# Patient Record
Sex: FEMALE | Race: WHITE | NOT HISPANIC OR LATINO | Employment: PART TIME | URBAN - METROPOLITAN AREA
[De-identification: names, ages, dates, MRNs, and addresses within clinical notes are randomized per-mention and may not be internally consistent; named-entity substitution may affect disease eponyms.]

---
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
## 2024-05-08 ENCOUNTER — HOSPITAL ENCOUNTER (OUTPATIENT)
Facility: CLINIC | Age: 19
End: 2024-05-08
Attending: INTERNAL MEDICINE | Admitting: INTERNAL MEDICINE
Payer: COMMERCIAL

## 2024-05-28 RX ORDER — OMEPRAZOLE 40 MG/1
40 CAPSULE, DELAYED RELEASE ORAL DAILY
COMMUNITY
End: 2024-05-31 | Stop reason: HOSPADM

## 2024-05-28 RX ORDER — MIRTAZAPINE 15 MG/1
15 TABLET, ORALLY DISINTEGRATING ORAL AT BEDTIME
COMMUNITY
End: 2024-05-31 | Stop reason: HOSPADM

## 2024-08-08 RX ORDER — BUPROPION HYDROCHLORIDE 100 MG/1
100 TABLET, EXTENDED RELEASE ORAL DAILY
COMMUNITY
Start: 2024-05-21 | End: 2025-05-21

## 2024-08-08 RX ORDER — MIRTAZAPINE 15 MG/1
15 TABLET, FILM COATED ORAL AT BEDTIME
COMMUNITY
Start: 2024-06-27

## 2024-08-15 ENCOUNTER — ANESTHESIA EVENT (OUTPATIENT)
Dept: SURGERY | Facility: CLINIC | Age: 19
End: 2024-08-15
Payer: COMMERCIAL

## 2024-08-15 ENCOUNTER — HOSPITAL ENCOUNTER (OUTPATIENT)
Facility: CLINIC | Age: 19
Discharge: HOME | End: 2024-08-15
Attending: INTERNAL MEDICINE | Admitting: INTERNAL MEDICINE
Payer: COMMERCIAL

## 2024-08-15 ENCOUNTER — ANESTHESIA (OUTPATIENT)
Dept: SURGERY | Facility: CLINIC | Age: 19
End: 2024-08-15
Payer: COMMERCIAL

## 2024-08-15 VITALS
BODY MASS INDEX: 19.23 KG/M2 | RESPIRATION RATE: 18 BRPM | HEART RATE: 65 BPM | DIASTOLIC BLOOD PRESSURE: 69 MMHG | TEMPERATURE: 97.6 F | WEIGHT: 104.5 LBS | SYSTOLIC BLOOD PRESSURE: 115 MMHG | HEIGHT: 62 IN | OXYGEN SATURATION: 99 %

## 2024-08-15 LAB
ALBUMIN SERPL BCG-MCNC: 4.5 G/DL (ref 3.5–5.2)
ALP SERPL-CCNC: 82 U/L (ref 40–150)
ALT SERPL W P-5'-P-CCNC: 55 U/L (ref 0–50)
ANION GAP SERPL CALCULATED.3IONS-SCNC: 17 MMOL/L (ref 7–15)
AST SERPL W P-5'-P-CCNC: 22 U/L (ref 0–45)
BILIRUB SERPL-MCNC: 0.3 MG/DL
BUN SERPL-MCNC: 5.7 MG/DL (ref 6–20)
CALCIUM SERPL-MCNC: 9.1 MG/DL (ref 8.8–10.4)
CHLORIDE SERPL-SCNC: 103 MMOL/L (ref 98–107)
CREAT SERPL-MCNC: 0.61 MG/DL (ref 0.51–0.95)
EGFRCR SERPLBLD CKD-EPI 2021: >90 ML/MIN/1.73M2
ERYTHROCYTE [DISTWIDTH] IN BLOOD BY AUTOMATED COUNT: 11.5 % (ref 10–15)
GLUCOSE SERPL-MCNC: 91 MG/DL (ref 70–99)
HCO3 SERPL-SCNC: 21 MMOL/L (ref 22–29)
HCT VFR BLD AUTO: 40 % (ref 35–47)
HGB BLD-MCNC: 14.2 G/DL (ref 11.7–15.7)
INR PPP: 1.05 (ref 0.85–1.15)
MCH RBC QN AUTO: 30 PG (ref 26.5–33)
MCHC RBC AUTO-ENTMCNC: 35.5 G/DL (ref 31.5–36.5)
MCV RBC AUTO: 84 FL (ref 78–100)
PLATELET # BLD AUTO: 200 10E3/UL (ref 150–450)
POTASSIUM SERPL-SCNC: 3.4 MMOL/L (ref 3.4–5.3)
PROT SERPL-MCNC: 6.9 G/DL (ref 6.4–8.3)
RBC # BLD AUTO: 4.74 10E6/UL (ref 3.8–5.2)
SODIUM SERPL-SCNC: 141 MMOL/L (ref 135–145)
UPPER EUS: NORMAL
WBC # BLD AUTO: 5.5 10E3/UL (ref 4–11)

## 2024-08-15 PROCEDURE — 88305 TISSUE EXAM BY PATHOLOGIST: CPT | Mod: TC | Performed by: INTERNAL MEDICINE

## 2024-08-15 PROCEDURE — 250N000009 HC RX 250

## 2024-08-15 PROCEDURE — 250N000009 HC RX 250: Performed by: INTERNAL MEDICINE

## 2024-08-15 PROCEDURE — 82247 BILIRUBIN TOTAL: CPT | Performed by: INTERNAL MEDICINE

## 2024-08-15 PROCEDURE — C1726 CATH, BAL DIL, NON-VASCULAR: HCPCS | Performed by: INTERNAL MEDICINE

## 2024-08-15 PROCEDURE — 250N000011 HC RX IP 250 OP 636: Performed by: ANESTHESIOLOGY

## 2024-08-15 PROCEDURE — 85610 PROTHROMBIN TIME: CPT | Performed by: INTERNAL MEDICINE

## 2024-08-15 PROCEDURE — 250N000011 HC RX IP 250 OP 636

## 2024-08-15 PROCEDURE — 85027 COMPLETE CBC AUTOMATED: CPT | Performed by: INTERNAL MEDICINE

## 2024-08-15 PROCEDURE — 36415 COLL VENOUS BLD VENIPUNCTURE: CPT | Performed by: INTERNAL MEDICINE

## 2024-08-15 PROCEDURE — 360N000075 HC SURGERY LEVEL 2, PER MIN: Performed by: INTERNAL MEDICINE

## 2024-08-15 PROCEDURE — 272N000001 HC OR GENERAL SUPPLY STERILE: Performed by: INTERNAL MEDICINE

## 2024-08-15 PROCEDURE — 999N000141 HC STATISTIC PRE-PROCEDURE NURSING ASSESSMENT: Performed by: INTERNAL MEDICINE

## 2024-08-15 PROCEDURE — 258N000003 HC RX IP 258 OP 636: Performed by: ANESTHESIOLOGY

## 2024-08-15 PROCEDURE — 710N000012 HC RECOVERY PHASE 2, PER MINUTE: Performed by: INTERNAL MEDICINE

## 2024-08-15 PROCEDURE — 370N000017 HC ANESTHESIA TECHNICAL FEE, PER MIN: Performed by: INTERNAL MEDICINE

## 2024-08-15 PROCEDURE — 88305 TISSUE EXAM BY PATHOLOGIST: CPT | Mod: 26 | Performed by: PATHOLOGY

## 2024-08-15 RX ORDER — LIDOCAINE 40 MG/G
CREAM TOPICAL
Status: DISCONTINUED | OUTPATIENT
Start: 2024-08-15 | End: 2024-08-15 | Stop reason: HOSPADM

## 2024-08-15 RX ORDER — SODIUM CHLORIDE, SODIUM LACTATE, POTASSIUM CHLORIDE, CALCIUM CHLORIDE 600; 310; 30; 20 MG/100ML; MG/100ML; MG/100ML; MG/100ML
INJECTION, SOLUTION INTRAVENOUS CONTINUOUS
Status: DISCONTINUED | OUTPATIENT
Start: 2024-08-15 | End: 2024-08-15 | Stop reason: HOSPADM

## 2024-08-15 RX ORDER — LIDOCAINE HYDROCHLORIDE 20 MG/ML
INJECTION, SOLUTION INFILTRATION; PERINEURAL PRN
Status: DISCONTINUED | OUTPATIENT
Start: 2024-08-15 | End: 2024-08-15

## 2024-08-15 RX ORDER — ONDANSETRON 2 MG/ML
INJECTION INTRAMUSCULAR; INTRAVENOUS PRN
Status: DISCONTINUED | OUTPATIENT
Start: 2024-08-15 | End: 2024-08-15

## 2024-08-15 RX ORDER — FENTANYL CITRATE 50 UG/ML
50 INJECTION, SOLUTION INTRAMUSCULAR; INTRAVENOUS EVERY 5 MIN PRN
Status: DISCONTINUED | OUTPATIENT
Start: 2024-08-15 | End: 2024-08-15 | Stop reason: HOSPADM

## 2024-08-15 RX ORDER — HYDROMORPHONE HCL IN WATER/PF 6 MG/30 ML
0.2 PATIENT CONTROLLED ANALGESIA SYRINGE INTRAVENOUS EVERY 5 MIN PRN
Status: DISCONTINUED | OUTPATIENT
Start: 2024-08-15 | End: 2024-08-15 | Stop reason: HOSPADM

## 2024-08-15 RX ORDER — LABETALOL HYDROCHLORIDE 5 MG/ML
10 INJECTION, SOLUTION INTRAVENOUS
Status: DISCONTINUED | OUTPATIENT
Start: 2024-08-15 | End: 2024-08-15 | Stop reason: HOSPADM

## 2024-08-15 RX ORDER — OXYCODONE HYDROCHLORIDE 5 MG/1
5 TABLET ORAL
Status: DISCONTINUED | OUTPATIENT
Start: 2024-08-15 | End: 2024-08-15 | Stop reason: HOSPADM

## 2024-08-15 RX ORDER — PANTOPRAZOLE SODIUM 40 MG/1
40 TABLET, DELAYED RELEASE ORAL
COMMUNITY
Start: 2024-08-12 | End: 2024-08-19

## 2024-08-15 RX ORDER — KETAMINE HYDROCHLORIDE 10 MG/ML
INJECTION INTRAMUSCULAR; INTRAVENOUS PRN
Status: DISCONTINUED | OUTPATIENT
Start: 2024-08-15 | End: 2024-08-15

## 2024-08-15 RX ORDER — NALOXONE HYDROCHLORIDE 0.4 MG/ML
0.1 INJECTION, SOLUTION INTRAMUSCULAR; INTRAVENOUS; SUBCUTANEOUS
Status: DISCONTINUED | OUTPATIENT
Start: 2024-08-15 | End: 2024-08-15 | Stop reason: HOSPADM

## 2024-08-15 RX ORDER — ONDANSETRON 4 MG/1
4 TABLET, ORALLY DISINTEGRATING ORAL EVERY 6 HOURS PRN
Status: DISCONTINUED | OUTPATIENT
Start: 2024-08-15 | End: 2024-08-15 | Stop reason: HOSPADM

## 2024-08-15 RX ORDER — ONDANSETRON 2 MG/ML
4 INJECTION INTRAMUSCULAR; INTRAVENOUS EVERY 30 MIN PRN
Status: DISCONTINUED | OUTPATIENT
Start: 2024-08-15 | End: 2024-08-15 | Stop reason: HOSPADM

## 2024-08-15 RX ORDER — ONDANSETRON 2 MG/ML
4 INJECTION INTRAMUSCULAR; INTRAVENOUS EVERY 6 HOURS PRN
Status: DISCONTINUED | OUTPATIENT
Start: 2024-08-15 | End: 2024-08-15 | Stop reason: HOSPADM

## 2024-08-15 RX ORDER — FLUMAZENIL 0.1 MG/ML
0.2 INJECTION, SOLUTION INTRAVENOUS
Status: DISCONTINUED | OUTPATIENT
Start: 2024-08-15 | End: 2024-08-15 | Stop reason: HOSPADM

## 2024-08-15 RX ORDER — SUCRALFATE 1 G/1
1 TABLET ORAL
COMMUNITY
Start: 2024-08-12 | End: 2024-08-19

## 2024-08-15 RX ORDER — PROPOFOL 10 MG/ML
INJECTION, EMULSION INTRAVENOUS PRN
Status: DISCONTINUED | OUTPATIENT
Start: 2024-08-15 | End: 2024-08-15

## 2024-08-15 RX ORDER — FENTANYL CITRATE 50 UG/ML
25 INJECTION, SOLUTION INTRAMUSCULAR; INTRAVENOUS EVERY 5 MIN PRN
Status: DISCONTINUED | OUTPATIENT
Start: 2024-08-15 | End: 2024-08-15 | Stop reason: HOSPADM

## 2024-08-15 RX ORDER — ONDANSETRON 4 MG/1
4 TABLET, ORALLY DISINTEGRATING ORAL EVERY 30 MIN PRN
Status: DISCONTINUED | OUTPATIENT
Start: 2024-08-15 | End: 2024-08-15 | Stop reason: HOSPADM

## 2024-08-15 RX ORDER — DEXAMETHASONE SODIUM PHOSPHATE 4 MG/ML
4 INJECTION, SOLUTION INTRA-ARTICULAR; INTRALESIONAL; INTRAMUSCULAR; INTRAVENOUS; SOFT TISSUE
Status: DISCONTINUED | OUTPATIENT
Start: 2024-08-15 | End: 2024-08-15 | Stop reason: HOSPADM

## 2024-08-15 RX ORDER — ACETAMINOPHEN 325 MG/1
975 TABLET ORAL
Status: DISCONTINUED | OUTPATIENT
Start: 2024-08-15 | End: 2024-08-15 | Stop reason: HOSPADM

## 2024-08-15 RX ORDER — GLYCOPYRROLATE 0.2 MG/ML
INJECTION, SOLUTION INTRAMUSCULAR; INTRAVENOUS PRN
Status: DISCONTINUED | OUTPATIENT
Start: 2024-08-15 | End: 2024-08-15

## 2024-08-15 RX ORDER — HYDROMORPHONE HCL IN WATER/PF 6 MG/30 ML
0.4 PATIENT CONTROLLED ANALGESIA SYRINGE INTRAVENOUS EVERY 5 MIN PRN
Status: DISCONTINUED | OUTPATIENT
Start: 2024-08-15 | End: 2024-08-15 | Stop reason: HOSPADM

## 2024-08-15 RX ADMIN — ONDANSETRON 4 MG: 2 INJECTION INTRAMUSCULAR; INTRAVENOUS at 13:50

## 2024-08-15 RX ADMIN — KETAMINE HYDROCHLORIDE 10 MG: 10 INJECTION INTRAMUSCULAR; INTRAVENOUS at 13:14

## 2024-08-15 RX ADMIN — GLYCOPYRROLATE 0.2 MG: 0.2 INJECTION, SOLUTION INTRAMUSCULAR; INTRAVENOUS at 13:07

## 2024-08-15 RX ADMIN — LIDOCAINE HYDROCHLORIDE 25 MG: 20 INJECTION, SOLUTION INFILTRATION; PERINEURAL at 13:06

## 2024-08-15 RX ADMIN — MIDAZOLAM 2 MG: 1 INJECTION INTRAMUSCULAR; INTRAVENOUS at 13:02

## 2024-08-15 RX ADMIN — SODIUM CHLORIDE, POTASSIUM CHLORIDE, SODIUM LACTATE AND CALCIUM CHLORIDE: 600; 310; 30; 20 INJECTION, SOLUTION INTRAVENOUS at 12:47

## 2024-08-15 RX ADMIN — PROPOFOL 50 MG: 10 INJECTION, EMULSION INTRAVENOUS at 13:06

## 2024-08-15 RX ADMIN — PROPOFOL 150 MCG/KG/MIN: 10 INJECTION, EMULSION INTRAVENOUS at 13:07

## 2024-08-15 RX ADMIN — ONDANSETRON 4 MG: 2 INJECTION INTRAMUSCULAR; INTRAVENOUS at 13:07

## 2024-08-15 ASSESSMENT — ACTIVITIES OF DAILY LIVING (ADL)
ADLS_ACUITY_SCORE: 29

## 2024-08-15 ASSESSMENT — LIFESTYLE VARIABLES: TOBACCO_USE: 1

## 2024-08-15 ASSESSMENT — ENCOUNTER SYMPTOMS: DYSRHYTHMIAS: 0

## 2024-08-15 NOTE — DISCHARGE INSTRUCTIONS
Endoscopic Ultrasound (EUS) Discharge Instructions    You have just had an endoscopic procedure.  Your follow-up instructions are indicated below:  You may not drive, operate machinery, make critical decisions, or do activities that require coordination or balance until tomorrow because of the medications you were given may cause dizziness, forgetfulness, and slower reaction times.  Do not drink any alcohol for the rest of the day because of the medications used.  You may resume eating, drinking, usual medications now.  Return to normal activities tomorrow.  Rest for the remainder of the day.  There may be some slight soreness where the tubes have been; this is normal and will wear off in a day or so.  Throat lozenges may be used as needed for a sore throat.  Some bloating may be present if air has remained in your stomach and/or bowel; this is normal and will go away in a few hours.    Additional Instructions:  <Recommendation>  <Patient instructions>    WHAT TO WATCH FOR    Problems rarely occur after the exam.  It is important for you to be aware of the early signs of a possible complication.  Call Immediately if you notice any of the followin. Unusual pain or difficulty swallowing    2. Vomiting of blood    3. Black or bloody stools    4. Temperature above 100.6 degrees F (37.5 degrees C)      If you have any questions:    If you have any questions, please contact your Physician.    If you feel that this has become a medical emergency please call 911, or visit the nearest Emergency Room       DR. BRUCE ESTEVEZ M.D.      CLINIC PHONE NUMBER:  [phone]  [facility]

## 2024-08-15 NOTE — ANESTHESIA POSTPROCEDURE EVALUATION
Patient: Marline Osuna    Procedure: Procedure(s):  ENDOSCOPIC ULTRASOUND, UPPER TRACT WITH BIOPSIES       Anesthesia Type:  MAC    Note:  Disposition: Outpatient   Postop Pain Control:    PONV: No   Neuro/Psych: Uneventful            Sign Out: Acceptable/Baseline neuro status   Airway/Respiratory: Uneventful            Sign Out: Acceptable/Baseline resp. status   CV/Hemodynamics: Uneventful            Sign Out: Acceptable CV status; No obvious hypovolemia; No obvious fluid overload   Other NRE:    DID A NON-ROUTINE EVENT OCCUR? No           Last vitals:  Vitals Value Taken Time   /58 08/15/24 1335   Temp 97.7  F (36.5  C) 08/15/24 1335   Pulse     Resp     SpO2 100 % 08/15/24 1343   Vitals shown include unfiled device data.    Electronically Signed By: Gloria Bray MD  August 15, 2024  1:44 PM

## 2024-08-15 NOTE — ANESTHESIA PREPROCEDURE EVALUATION
Anesthesia Pre-Procedure Evaluation    Patient: Marline Osuna   MRN: [id number] : 2005        Procedure : Procedure(s):  ENDOSCOPIC RETROGRADE CHOLANGIOPANCREATOGRAPHY  ENDOSCOPIC ULTRASOUND, UPPER TRACT          History reviewed. No pertinent past medical history.   Past Surgical History:   Procedure Laterality Date    APPENDECTOMY      CHOLECYSTECTOMY      COLONOSCOPY      EGD        Allergies   Allergen Reactions    Latex Hives    Pcn [Penicillins]      Unsure.     Amoxicillin-Pot Clavulanate Rash     PN: LW Reaction: Rash, Generalized        Social History     Tobacco Use    Smoking status: Every Day     Types: Vaping Device     Start date:     Smokeless tobacco: Never    Tobacco comments:     father and brothers    Substance Use Topics    Alcohol use: Yes     Comment: rare      Wt Readings from Last 1 Encounters:   08/15/24 47.4 kg (104 lb 8 oz)        Anesthesia Evaluation   Pt has had prior anesthetic. Type: General.    No history of anesthetic complications       ROS/MED HX  ENT/Pulmonary:     (+)                tobacco use, Current use,                    (-) asthma and recent URI   Neurologic:       Cardiovascular:  - neg cardiovascular ROS  (-) arrhythmias and valvular problems/murmurs   METS/Exercise Tolerance:     Hematologic:       Musculoskeletal:       GI/Hepatic:     (+) GERD, Asymptomatic on medication,                  Renal/Genitourinary:  - neg Renal ROS     Endo:  - neg endo ROS     Psychiatric/Substance Use:     (+) psychiatric history  alcohol abuse  Recreational drug usage: Cannabis.    Infectious Disease:       Malignancy:       Other:            Physical Exam    Airway        Mallampati: II   TM distance: > 3 FB   Neck ROM: full   Mouth opening: > 3 cm    Respiratory Devices and Support         Dental       (+) Completely normal teeth      Cardiovascular   cardiovascular exam normal          Pulmonary   pulmonary exam normal                OUTSIDE LABS:  CBC:   Lab  "Results   Component Value Date    WBC 10.3 02/06/2008    HGB 11.7 02/06/2008    HCT 34.3 02/06/2008     02/06/2008     BMP:   Lab Results   Component Value Date     02/06/2008    POTASSIUM 3.7 02/06/2008    CHLORIDE 101 02/06/2008    CO2 20 02/06/2008    BUN 7 02/06/2008    CR 0.35 02/06/2008     (H) 02/06/2008     COAGS: No results found for: \"PTT\", \"INR\", \"FIBR\"  POC:   Lab Results   Component Value Date    HCG Negative 04/26/2022     HEPATIC:   Lab Results   Component Value Date    ALBUMIN 3.7 02/06/2008    PROTTOTAL 6.8 02/06/2008    ALT 87 (H) 02/06/2008    AST 31 02/06/2008    ALKPHOS 435 (H) 02/06/2008    BILITOTAL 0.6 02/06/2008     OTHER:   Lab Results   Component Value Date    GONZALES 9.4 02/06/2008       Anesthesia Plan    ASA Status:  2    NPO Status:  NPO Appropriate    Anesthesia Type: MAC.     - Reason for MAC: straight local not clinically adequate              Consents    Anesthesia Plan(s) and associated risks, benefits, and realistic alternatives discussed. Questions answered and patient/representative(s) expressed understanding.     - Discussed:     - Discussed with:  Patient, Parent (Mother and/or Father)      - Specific Concerns: conversion to GA.     - Extended Intubation/Ventilatory Support Discussed: No.      - Patient is DNR/DNI Status: No     Use of blood products discussed: No .     Postoperative Care    Pain management: IV analgesics.   PONV prophylaxis: Ondansetron (or other 5HT-3)     Comments:               Gloria Bray MD    I have reviewed the pertinent notes and labs in the chart from the past 30 days and (re)examined the patient.  Any updates or changes from those notes are reflected in this note.                  "

## 2024-08-16 LAB
PATH REPORT.COMMENTS IMP SPEC: NORMAL
PATH REPORT.COMMENTS IMP SPEC: NORMAL
PATH REPORT.FINAL DX SPEC: NORMAL
PATH REPORT.GROSS SPEC: NORMAL
PATH REPORT.MICROSCOPIC SPEC OTHER STN: NORMAL
PATH REPORT.RELEVANT HX SPEC: NORMAL
PHOTO IMAGE: NORMAL

## 2024-09-01 ENCOUNTER — HEALTH MAINTENANCE LETTER (OUTPATIENT)
Age: 19
End: 2024-09-01

## 2024-09-05 ENCOUNTER — PATIENT OUTREACH (OUTPATIENT)
Dept: ONCOLOGY | Facility: CLINIC | Age: 19
End: 2024-09-05
Payer: COMMERCIAL

## 2024-09-05 ENCOUNTER — TRANSCRIBE ORDERS (OUTPATIENT)
Dept: OTHER | Age: 19
End: 2024-09-05

## 2024-09-05 DIAGNOSIS — N63.10 LUMP OF RIGHT BREAST: Primary | ICD-10-CM

## 2024-09-05 NOTE — PROGRESS NOTES
New Patient Oncology Nurse Navigator Note     Referring provider: Self Referral      Referring Clinic/Organization: [facility]      Referred to (specialty:) Breast Provider      Requested provider (if applicable): NA     Date Referral Received: September 5, 2024     Evaluation for:  N63.10 (ICD-10-CM) - Lump of right breast      Clinical History (per Nurse review of records provided):      RIGHT BREAST ULTRASOUND, 4/10/2024     CLINICAL HISTORY:   18-year-old with tender palpable lump in the RIGHT upper outer quadrant.   She states she did hit her breast with the car door about a month ago and   it has been hurting since then. Patient also states she has changed her   hormones recently. Denies skin redness.     COMPARISON:   No priors.     TECHNIQUE:   Real-time ultrasound imaging of RIGHT breast with imaging documentation.   Scanning was performed by both the technologist and the radiologist.     FINDINGS:   RIGHT 10-11 o'clock, 6 cm from the nipple: Tender palpable lump in this   region correlates to a ridge of dense fibroglandular tissue, with similar   echotexture to tissue elsewhere in the RIGHT breast and in the opposite   LEFT upper outer quadrant, which was scanned for comparison purposes.     No solid or cystic masses. No abscess or abnormal fluid collection. No   axillary lymphadenopathy.     Impression    Tender palpable lump RIGHT upper outer quadrant correlates to a ridge of  dense fibroglandular tissue.    RECOMMENDATIONS:  Clinical management of tender RIGHT palpable breast lump.    Results and recommendations were discussed with the patient at the time of  the exam.    BI-RADS Category 1: Negative    Dictated by: Michelle Ku MD @4/10/2024 3:56:06 PM    4/25/24: Meet with Genetic Counselor      Records Location: Care Everywhere and See Bookmarked material     Records Needed:     Results of Genetic testing done on 4/25/24  Images pushed from 4/10/24 to PACS      Additional testing needed prior  to consult: NA    Payor: BLUE PLUS / Plan: [employer]VANTAGE [place] / Product Type: HMO /     September 5, 2024    Called patient to introduced myself and role as nurse navigator with [facility] Hematology/Oncology department and to inform them that we have received a self referral for a diagnosis of breast lump abnormal imaging. Patient confirms they are aware of the referral and ready to schedule. Patient is able to do video consult if necessary / recommended.    Explained to patient that they will receive a call from our new patient scheduling team (NPS number below, hours are Monday - Friday 8am - 4:30 pm) in the next 1-2 business days to schedule the consultation. Provided them with contact information for NPS and encourage them to call with any questions. Patient verbalized understanding of plan.     Leda PIERRE, RN   Oncology Nurse Navigator   [facility]   [phone] / [phone]

## 2024-09-16 NOTE — PROGRESS NOTES
NEW CONSULTATION   Sep 18, 2024     Marline Osuna is a 19 year old woman who presents with right breast lump, pain, and nipple inversion.     HPI:    Family history of mother with breast cancer diagnosed at age 38. Her mother had negative genetic testing in 2004. Marline had genetic testing done in April, 2024 at [facility], she reports the testing needs to be redone.     About a year ago she hit her right breast on a car door. She has had right breast focal pain in he upper outer breast since then. The pain occurs everyday when the breast is touched. She saw her primary care provider who palpated a lump in the area of right breast pain. She had a right breast ultrasound 4/10/24 that was normal. Since then the pain has migrated to her right nipple. The right nipple was also inverted 2 days ago. She denies nipple discharge.     BREAST-SPECIFIC HISTORY:    Previous breast imaging: Yes  - 4/10/24 right breast ultrasound for lump: BI-RADS 1  - 9/19/24 right breast ultrasound     Prior breast biopsies/surgeries: No    Prior history of breast cancer or DCIS: No  Prior radiation history: No   Breast density:     GYN HISTORY:  G0  Age at menarche: 12  Menopausal: depot shot. 1 year. nexplanon  Menopausal hormone replacement therapy: No     RISK ASSESSMENT: < 5% 10 year risk by DELON, and > 20% lifetime risk by DELON  Lorraine: n/a  DELON/Naida: 25.6% lifetime risk, 0.1% 10 year risk     FAMILY HISTORY:  Breast ca: Yes  - mother,38 stage 4 breast cancer. Passed of alcoholism. Negative genetic testing in 2004.   - maternal great grandmother  - paternal great grandmother  Ovarian ca: yes  - paternal great grandmother  Pancreatic ca: Yes  - paternal grandmother sister  Prostate: No  Gastric ca: No  Melanoma: No  Colon ca: No  Other cancer: No  Other genetic, testing, syndromes, or clotting disorders: no     PAST MEDICAL HISTORY  No past medical history on file.  Eosinophilic esophagitis.     PAST SURGICAL HISTORY   Past Surgical  "History:   Procedure Laterality Date    APPENDECTOMY  2021    CHOLECYSTECTOMY  2021    COLONOSCOPY      EGD      ENDOSCOPIC ULTRASOUND UPPER GASTROINTESTINAL TRACT (GI) N/A 8/15/2024    Procedure: ENDOSCOPIC ULTRASOUND, UPPER TRACT WITH BIOPSIES;  Surgeon: Wilson Holcomb MD;  Location:  OR     MEDICATIONS  Current Outpatient Medications   Medication Sig Dispense Refill    buPROPion (WELLBUTRIN SR) 100 MG 12 hr tablet Take 100 mg by mouth daily      mirtazapine (REMERON) 15 MG tablet Take 15 mg by mouth at bedtime      omeprazole (PRILOSEC) 20 MG DR capsule Take 20 mg by mouth daily (Patient not taking: Reported on 9/18/2024)       ALLERGIES  Allergies   Allergen Reactions    Latex Hives    Pcn [Penicillins]      Unsure.     Amoxicillin-Pot Clavulanate Rash     PN: LW Reaction: Rash, Generalized        SOCIAL HISTORY:  Smokes: Yes, actively trying to quit.   EtOH: No  Exercise: active    Seen today with her friend.     ROS:   Change in vision No  Headaches: no  Respiratory: No shortness of breath, dyspnea on exertion, cough, or hemoptysis   Cardiovascular: negative   Gastrointestinal: esophagus disorder   Breast: right breast pain, lump, and nipple inversion  Musculoskeletal: negative Joint pain No Back pain: no  Psychiatric: negative  Hematologic/Lymphatic/Immunologic: negative  Endocrine: negative    EXAM  /75 (BP Location: Right arm, Patient Position: Sitting, Cuff Size: Adult Regular)   Pulse 95   Temp 98.2  F (36.8  C) (Oral)   Resp 15   Ht 1.565 m (5' 1.61\")   Wt 47.6 kg (104 lb 14.4 oz)   SpO2 98%   BMI 19.43 kg/m     PHYSICAL EXAM  Respiratory: breathing non labored.   Breasts: Examination was done in both the upright and supine positions.  Breasts are symmetrical . No masses noted. No skin or nipple changes. No nipple discharge.   No clavicular, cervical, or axillary lymphadenopathy.     INVESTIGATIONS:    9/18/24 right breast ultrasound per Radiology no concerning findings. "     ASSESSMENT/PLAN:    Marline Osuna is a 19 year old woman with right breast pain. No concerning findings on exam or ultrasound today. She does have a family history of breast cancer and meets NCCN guidelines for high risk screening.     1) Right breast pain  Breast pain is common. Up to 70% of women will experience breast pain in their lifetime.   Breast pain is most often related to normal physiologic change (hormonal fluctuations) and is a rare symptom of breast cancer.   Management of breast pain  Evaluation with imaging is important to determine if the pain is due to normal physiologic changes related to hormonal fluctuations or to a pathologic process.   Physical support  Wear a supportive Bra that is professionally fit and sized. Be sure your bra is not overly compressive or restrictive.   Wear a sports bra when exercising.   Wear comfortable breast support while sleeping.   Improving omega 3 fatty acid intake may improve breast pain. Eat 1-2 tablespoons of flaxseed daily.   Plant-based diets may help with breast pain.  Warm compresses or ice packs can be used  Acupuncture may help with breast pain.   Consider eliminating or decrease caffeine (chocolate, tea, coffee, soda) for a two week period of time to see if pain improves/resolves  Evening Primrose Oil starting with 500 mg/day. May increase to 1000 mg 3 times daily for 6 months. Can be tried.   Chasteberry 400-500 mg/day can be tried.   If you are on hormone based medications adjusting or decreasing these medications may help.   If needed over the counter analgesics such as acetaminophen, ibuprofen, or topical diclofenac cream can intermittently be used.   Smoking cessation may improve breast pain.      2) Family history of breast cancer  She meets NCCN guidelines for high risk screening based on family history with lifetime risk for breast cancer of >20%. Screening recommendations based on NCCN guidelines. Be familiar with your breast and promptly  report any changes to your health care provider. Clinical encounter every 6-12 months. Annual mammogram with gilda starting 10 years prior to the youngest family member but not less than age 30 or age 40 ( whichever comes first). Annual breast MRI to begin 10 years prior to the youngest family member diagnosed but not less than 25 years old or age 40 ( whichever comes first).  Counseling was provided with available strategies including lifestyle modifications and risk reducing intervention.   - Clinic visit with breast exam due 9/2025  - Breast MRI's to begin at age 28  - Screening mammogram's to begin at age 30  - Follow up with Genetic Counseling.      3) Lifestyle Modifications were provided.   - Maintain your best healthy weight. Higher body fat and adult weight gain is associated with increased risk for breast cancer. This increase in risk has been attributed to increase in circulating endogenous estrogen levels from fat tissue.   - Any alcohol intake increases the risk for breast cancer. If you choose to drink alcohol limit alcohol consumption to less than 1 drink (1 ounce of liquor, 6 ounces of wine, or 8 ounces of beer) per day or less than 3 drinks per week.  - Be active daily and void being sedentary.   - Vitamin D may decrease the risk of developing breast cancer.     Kimberli Velazquez PA-C    30 minutes spent on the date of the encounter doing chart review, review of test results, interpretation of tests, patient visit and documentation.

## 2024-09-17 NOTE — TELEPHONE ENCOUNTER
RECORDS STATUS - BREAST    RECORDS REQUESTED FROM: Kirst[facility]   Appt date: TBD NN WQ    Lump of Right breast   Action    Action Taken 9/17/2024 10:57AM KEELLE   I called [facility] Ph: [phone]- they will push the US Breast scan to [facility] PACS    I called pt Marline - she had some genetic testing done at [facility]. The results were inaccurate so she had to do the testing over again. The results are still in process/pending.      I called [facility] [phone]  #1 #0 - they pulled up pt's chart. They couldn't locate any genetic reports.       NOTES DETAILS STATUS   OFFICE NOTE from referring provider  Self    OFFICE NOTE from medical oncologist     OFFICE NOTE from surgeon     OFFICE NOTE from radiation oncologist     DISCHARGE SUMMARY from hospital     DISCHARGE REPORT from the ER     OPERATIVE REPORT     MEDICATION LIST In EPIC    CLINICAL TRIAL TREATMENTS TO DATE     LABS     REQUEST BLOCKS FOR ALL BREAST CANCER PTS     PATHOLOGY REPORTS  (Tissue diagnosis, Stage, ER/GA percentage positive and intensity of staining, HER2 IHC, FISH, and all biopsies from breast and any distant metastasis)                     PATHOLOGY FEDEX TRACKING:   TRACKING #:   GENONOMIC TESTING     TYPE:   (Next Generation Sequencing, including Foundation One testing, and Oncotype score)     IMAGING (NEED IMAGES & REPORT)     ULTRASOUND In PACS- [facility]  US Breast 4/15/2024    IMAGE DISC FEDEX TRACKING    TRACKING # :

## 2024-09-18 ENCOUNTER — ANCILLARY PROCEDURE (OUTPATIENT)
Dept: MAMMOGRAPHY | Facility: CLINIC | Age: 19
End: 2024-09-18
Attending: PHYSICIAN ASSISTANT
Payer: COMMERCIAL

## 2024-09-18 ENCOUNTER — ONCOLOGY VISIT (OUTPATIENT)
Dept: SURGERY | Facility: CLINIC | Age: 19
End: 2024-09-18
Attending: PHYSICIAN ASSISTANT
Payer: COMMERCIAL

## 2024-09-18 ENCOUNTER — PRE VISIT (OUTPATIENT)
Dept: ONCOLOGY | Facility: CLINIC | Age: 19
End: 2024-09-18

## 2024-09-18 VITALS
RESPIRATION RATE: 15 BRPM | HEART RATE: 95 BPM | BODY MASS INDEX: 19.3 KG/M2 | DIASTOLIC BLOOD PRESSURE: 75 MMHG | SYSTOLIC BLOOD PRESSURE: 117 MMHG | TEMPERATURE: 98.2 F | HEIGHT: 62 IN | OXYGEN SATURATION: 98 % | WEIGHT: 104.9 LBS

## 2024-09-18 DIAGNOSIS — N63.0 LUMP OR MASS IN BREAST: ICD-10-CM

## 2024-09-18 DIAGNOSIS — Z91.89 AT HIGH RISK FOR BREAST CANCER: Primary | ICD-10-CM

## 2024-09-18 DIAGNOSIS — Z80.3 FAMILY HISTORY OF BREAST CANCER: ICD-10-CM

## 2024-09-18 PROCEDURE — G0463 HOSPITAL OUTPT CLINIC VISIT: HCPCS | Performed by: PHYSICIAN ASSISTANT

## 2024-09-18 PROCEDURE — 99214 OFFICE O/P EST MOD 30 MIN: CPT | Performed by: PHYSICIAN ASSISTANT

## 2024-09-18 PROCEDURE — 76642 ULTRASOUND BREAST LIMITED: CPT | Mod: RT | Performed by: STUDENT IN AN ORGANIZED HEALTH CARE EDUCATION/TRAINING PROGRAM

## 2024-09-18 ASSESSMENT — PAIN SCALES - GENERAL: PAINLEVEL: NO PAIN (0)

## 2024-09-18 NOTE — PATIENT INSTRUCTIONS
Marline Osuna is a 19 year old woman with right breast pain. No concerning findings on exam or ultrasound today. She does have a family history of breast cancer and meets NCCN guidelines for high risk screening.     1) Right breast pain  Breast pain is common. Up to 70% of women will experience breast pain in their lifetime.   Breast pain is most often related to normal physiologic change (hormonal fluctuations) and is a rare symptom of breast cancer.   Management of breast pain  Evaluation with imaging is important to determine if the pain is due to normal physiologic changes related to hormonal fluctuations or to a pathologic process.   Physical support  Wear a supportive Bra that is professionally fit and sized. Be sure your bra is not overly compressive or restrictive.   Wear a sports bra when exercising.   Wear comfortable breast support while sleeping.   Improving omega 3 fatty acid intake may improve breast pain. Eat 1-2 tablespoons of flaxseed daily.   Plant-based diets may help with breast pain.  Warm compresses or ice packs can be used  Acupuncture may help with breast pain.   Consider eliminating or decrease caffeine (chocolate, tea, coffee, soda) for a two week period of time to see if pain improves/resolves  Evening Primrose Oil starting with 500 mg/day. May increase to 1000 mg 3 times daily for 6 months. Can be tried.   Chasteberry 400-500 mg/day can be tried.   If you are on hormone based medications adjusting or decreasing these medications may help.   If needed over the counter analgesics such as acetaminophen, ibuprofen, or topical diclofenac cream can intermittently be used.   Smoking cessation may improve breast pain.      2) Family history of breast cancer  She meets NCCN guidelines for high risk screening based on family history with lifetime risk for breast cancer of >20%. Screening recommendations based on NCCN guidelines. Be familiar with your breast and promptly report any changes to your  health care provider. Clinical encounter every 6-12 months. Annual mammogram with gilda starting 10 years prior to the youngest family member but not less than age 30 or age 40 ( whichever comes first). Annual breast MRI to begin 10 years prior to the youngest family member diagnosed but not less than 25 years old or age 40 ( whichever comes first).  Counseling was provided with available strategies including lifestyle modifications and risk reducing intervention.   - Clinic visit with breast exam due 9/2025  - Breast MRI's to begin at age 28  - Screening mammogram's to begin at age 30  - Follow up with Genetic Counseling.      3) Lifestyle Modifications were provided.   - Maintain your best healthy weight. Higher body fat and adult weight gain is associated with increased risk for breast cancer. This increase in risk has been attributed to increase in circulating endogenous estrogen levels from fat tissue.   - Any alcohol intake increases the risk for breast cancer. If you choose to drink alcohol limit alcohol consumption to less than 1 drink (1 ounce of liquor, 6 ounces of wine, or 8 ounces of beer) per day or less than 3 drinks per week.  - Be active daily and void being sedentary.   - Vitamin D may decrease the risk of developing breast cancer.

## 2024-09-18 NOTE — NURSING NOTE
"Oncology Rooming Note    September 18, 2024 9:52 AM   Marline Osuna is a 19 year old female who presents for:    Chief Complaint   Patient presents with    Oncology Clinic Visit     Lump of right breast     Initial Vitals: /75 (BP Location: Right arm, Patient Position: Sitting, Cuff Size: Adult Regular)   Pulse 95   Temp 98.2  F (36.8  C) (Oral)   Resp 15   Ht 1.565 m (5' 1.61\")   Wt 47.6 kg (104 lb 14.4 oz)   SpO2 98%   BMI 19.43 kg/m   Estimated body mass index is 19.43 kg/m  as calculated from the following:    Height as of this encounter: 1.565 m (5' 1.61\").    Weight as of this encounter: 47.6 kg (104 lb 14.4 oz). Body surface area is 1.44 meters squared.  No Pain (0) Comment: Data Unavailable   No LMP recorded. Patient has had an injection.  Allergies reviewed: Yes  Medications reviewed: Yes    Medications: Medication refills not needed today.  Pharmacy name entered into [employer] [facility]: [id number] - [facility] [id number]  [address]    Frailty Screening:   Is the patient here for a new oncology consult visit in cancer care? 1. Yes. Over the past month, have you experienced difficulty or required a caregiver to assist with:   1. Balance, walking or general mobility (including any falls)? NO  2. Completion of self-care tasks such as bathing, dressing, toileting, grooming/hygiene?  NO  3. Concentration or memory that affects your daily life?  NO       Clinical concerns: Pt reports that nipple was inverted and rashes on breast and in middle between breasts. Kimberli was notified via message.       Sneha Devlin, EMT     "

## 2024-09-18 NOTE — LETTER
9/18/2024      Marline Osuna  [address] [facility] [place] [address]      Dear Colleague,    Thank you for referring your patient, Marline Osuna, to the [facility]. Please see a copy of my visit note below.    NEW CONSULTATION   Sep 18, 2024     Marline Osuna is a 19 year old woman who presents with right breast lump, pain, and nipple inversion.     HPI:    Family history of mother with breast cancer diagnosed at age 38. Her mother had negative genetic testing in 2004. Marline had genetic testing done in April, 2024 at [facility], she reports the testing needs to be redone.     About a year ago she hit her right breast on a car door. She has had right breast focal pain in he upper outer breast since then. The pain occurs everyday when the breast is touched. She saw her primary care provider who palpated a lump in the area of right breast pain. She had a right breast ultrasound 4/10/24 that was normal. Since then the pain has migrated to her right nipple. The right nipple was also inverted 2 days ago. She denies nipple discharge.     BREAST-SPECIFIC HISTORY:    Previous breast imaging: Yes  - 4/10/24 right breast ultrasound for lump: BI-RADS 1  - 9/19/24 right breast ultrasound     Prior breast biopsies/surgeries: No    Prior history of breast cancer or DCIS: No  Prior radiation history: No   Breast density:     GYN HISTORY:  G0  Age at menarche: 12  Menopausal: depot shot. 1 year. nexplanon  Menopausal hormone replacement therapy: No     RISK ASSESSMENT: < 5% 10 year risk by DELON, and > 20% lifetime risk by DELON  Lorraine: n/a  DELON/Naida: 25.6% lifetime risk, 0.1% 10 year risk     FAMILY HISTORY:  Breast ca: Yes  - mother,38 stage 4 breast cancer. Passed of alcoholism. Negative genetic testing in 2004.   - maternal great grandmother  - paternal great grandmother  Ovarian ca: yes  - paternal great grandmother  Pancreatic ca: Yes  - paternal grandmother sister  Prostate: No  Gastric ca:  "No  Melanoma: No  Colon ca: No  Other cancer: No  Other genetic, testing, syndromes, or clotting disorders: no     PAST MEDICAL HISTORY  No past medical history on file.  Eosinophilic esophagitis.     PAST SURGICAL HISTORY   Past Surgical History:   Procedure Laterality Date     APPENDECTOMY  2021     CHOLECYSTECTOMY  2021     COLONOSCOPY       EGD       ENDOSCOPIC ULTRASOUND UPPER GASTROINTESTINAL TRACT (GI) N/A 8/15/2024    Procedure: ENDOSCOPIC ULTRASOUND, UPPER TRACT WITH BIOPSIES;  Surgeon: Wilson Holcomb MD;  Location:  OR     MEDICATIONS  Current Outpatient Medications   Medication Sig Dispense Refill     buPROPion (WELLBUTRIN SR) 100 MG 12 hr tablet Take 100 mg by mouth daily       mirtazapine (REMERON) 15 MG tablet Take 15 mg by mouth at bedtime       omeprazole (PRILOSEC) 20 MG DR capsule Take 20 mg by mouth daily (Patient not taking: Reported on 9/18/2024)       ALLERGIES  Allergies   Allergen Reactions     Latex Hives     Pcn [Penicillins]      Unsure.      Amoxicillin-Pot Clavulanate Rash     PN: LW Reaction: Rash, Generalized        SOCIAL HISTORY:  Smokes: Yes, actively trying to quit.   EtOH: No  Exercise: active    Seen today with her friend.     ROS:   Change in vision No  Headaches: no  Respiratory: No shortness of breath, dyspnea on exertion, cough, or hemoptysis   Cardiovascular: negative   Gastrointestinal: esophagus disorder   Breast: right breast pain, lump, and nipple inversion  Musculoskeletal: negative Joint pain No Back pain: no  Psychiatric: negative  Hematologic/Lymphatic/Immunologic: negative  Endocrine: negative    EXAM  /75 (BP Location: Right arm, Patient Position: Sitting, Cuff Size: Adult Regular)   Pulse 95   Temp 98.2  F (36.8  C) (Oral)   Resp 15   Ht 1.565 m (5' 1.61\")   Wt 47.6 kg (104 lb 14.4 oz)   SpO2 98%   BMI 19.43 kg/m     PHYSICAL EXAM  Respiratory: breathing non labored.   Breasts: Examination was done in both the upright and supine positions. "  Breasts are symmetrical . No masses noted. No skin or nipple changes. No nipple discharge.   No clavicular, cervical, or axillary lymphadenopathy.     INVESTIGATIONS:    9/18/24 right breast ultrasound per Radiology no concerning findings.     ASSESSMENT/PLAN:    Marline Osuna is a 19 year old woman with right breast pain. No concerning findings on exam or ultrasound today. She does have a family history of breast cancer and meets NCCN guidelines for high risk screening.     1) Right breast pain  Breast pain is common. Up to 70% of women will experience breast pain in their lifetime.   Breast pain is most often related to normal physiologic change (hormonal fluctuations) and is a rare symptom of breast cancer.   Management of breast pain  Evaluation with imaging is important to determine if the pain is due to normal physiologic changes related to hormonal fluctuations or to a pathologic process.   Physical support  Wear a supportive Bra that is professionally fit and sized. Be sure your bra is not overly compressive or restrictive.   Wear a sports bra when exercising.   Wear comfortable breast support while sleeping.   Improving omega 3 fatty acid intake may improve breast pain. Eat 1-2 tablespoons of flaxseed daily.   Plant-based diets may help with breast pain.  Warm compresses or ice packs can be used  Acupuncture may help with breast pain.   Consider eliminating or decrease caffeine (chocolate, tea, coffee, soda) for a two week period of time to see if pain improves/resolves  Evening Primrose Oil starting with 500 mg/day. May increase to 1000 mg 3 times daily for 6 months. Can be tried.   Chasteberry 400-500 mg/day can be tried.   If you are on hormone based medications adjusting or decreasing these medications may help.   If needed over the counter analgesics such as acetaminophen, ibuprofen, or topical diclofenac cream can intermittently be used.   Smoking cessation may improve breast pain.      2) Family  history of breast cancer  She meets NCCN guidelines for high risk screening based on family history with lifetime risk for breast cancer of >20%. Screening recommendations based on NCCN guidelines. Be familiar with your breast and promptly report any changes to your health care provider. Clinical encounter every 6-12 months. Annual mammogram with gilda starting 10 years prior to the youngest family member but not less than age 30 or age 40 ( whichever comes first). Annual breast MRI to begin 10 years prior to the youngest family member diagnosed but not less than 25 years old or age 40 ( whichever comes first).  Counseling was provided with available strategies including lifestyle modifications and risk reducing intervention.   - Clinic visit with breast exam due 9/2025  - Breast MRI's to begin at age 27  - Screening mammogram's to begin at age 30  - Follow up with Genetic Counseling.      3) Lifestyle Modifications were provided.   - Maintain your best healthy weight. Higher body fat and adult weight gain is associated with increased risk for breast cancer. This increase in risk has been attributed to increase in circulating endogenous estrogen levels from fat tissue.   - Any alcohol intake increases the risk for breast cancer. If you choose to drink alcohol limit alcohol consumption to less than 1 drink (1 ounce of liquor, 6 ounces of wine, or 8 ounces of beer) per day or less than 3 drinks per week.  - Be active daily and void being sedentary.   - Vitamin D may decrease the risk of developing breast cancer.     Kimberli Velazquez PA-C    30 minutes spent on the date of the encounter doing chart review, review of test results, interpretation of tests, patient visit and documentation.        Again, thank you for allowing me to participate in the care of your patient.        Sincerely,        Kimberli Velazquez PA-C

## 2024-10-19 ENCOUNTER — HOSPITAL ENCOUNTER (EMERGENCY)
Facility: CLINIC | Age: 19
Discharge: HOME | End: 2024-10-19
Attending: PHYSICIAN ASSISTANT | Admitting: PHYSICIAN ASSISTANT
Payer: COMMERCIAL

## 2024-10-19 VITALS
OXYGEN SATURATION: 97 % | HEART RATE: 104 BPM | TEMPERATURE: 98.6 F | SYSTOLIC BLOOD PRESSURE: 133 MMHG | BODY MASS INDEX: 19.84 KG/M2 | DIASTOLIC BLOOD PRESSURE: 54 MMHG | RESPIRATION RATE: 18 BRPM | WEIGHT: 107.14 LBS

## 2024-10-19 DIAGNOSIS — R10.9 CHRONIC ABDOMINAL PAIN: ICD-10-CM

## 2024-10-19 DIAGNOSIS — R74.8 ELEVATED LIPASE: ICD-10-CM

## 2024-10-19 DIAGNOSIS — K60.2 ANAL FISSURE: ICD-10-CM

## 2024-10-19 DIAGNOSIS — G89.29 CHRONIC ABDOMINAL PAIN: ICD-10-CM

## 2024-10-19 DIAGNOSIS — K58.1 IRRITABLE BOWEL SYNDROME WITH CONSTIPATION: ICD-10-CM

## 2024-10-19 LAB
ALBUMIN SERPL BCG-MCNC: 5 G/DL (ref 3.5–5.2)
ALP SERPL-CCNC: 103 U/L (ref 40–150)
ALT SERPL W P-5'-P-CCNC: 114 U/L (ref 0–50)
ANION GAP SERPL CALCULATED.3IONS-SCNC: 15 MMOL/L (ref 7–15)
AST SERPL W P-5'-P-CCNC: 43 U/L (ref 0–45)
BASOPHILS # BLD AUTO: 0 10E3/UL (ref 0–0.2)
BASOPHILS NFR BLD AUTO: 0 %
BILIRUB SERPL-MCNC: 0.3 MG/DL
BUN SERPL-MCNC: 11.3 MG/DL (ref 6–20)
CALCIUM SERPL-MCNC: 9.8 MG/DL (ref 8.8–10.4)
CHLORIDE SERPL-SCNC: 103 MMOL/L (ref 98–107)
CREAT SERPL-MCNC: 0.65 MG/DL (ref 0.51–0.95)
EGFRCR SERPLBLD CKD-EPI 2021: >90 ML/MIN/1.73M2
EOSINOPHIL # BLD AUTO: 0 10E3/UL (ref 0–0.7)
EOSINOPHIL NFR BLD AUTO: 0 %
ERYTHROCYTE [DISTWIDTH] IN BLOOD BY AUTOMATED COUNT: 11.8 % (ref 10–15)
GLUCOSE SERPL-MCNC: 91 MG/DL (ref 70–99)
HCO3 SERPL-SCNC: 23 MMOL/L (ref 22–29)
HCT VFR BLD AUTO: 41.8 % (ref 35–47)
HGB BLD-MCNC: 14.9 G/DL (ref 11.7–15.7)
HOLD SPECIMEN: NORMAL
HOLD SPECIMEN: NORMAL
IMM GRANULOCYTES # BLD: 0 10E3/UL
IMM GRANULOCYTES NFR BLD: 0 %
LIPASE SERPL-CCNC: 111 U/L (ref 13–60)
LYMPHOCYTES # BLD AUTO: 2 10E3/UL (ref 0.8–5.3)
LYMPHOCYTES NFR BLD AUTO: 24 %
MCH RBC QN AUTO: 29.7 PG (ref 26.5–33)
MCHC RBC AUTO-ENTMCNC: 35.6 G/DL (ref 31.5–36.5)
MCV RBC AUTO: 83 FL (ref 78–100)
MONOCYTES # BLD AUTO: 0.5 10E3/UL (ref 0–1.3)
MONOCYTES NFR BLD AUTO: 6 %
NEUTROPHILS # BLD AUTO: 5.8 10E3/UL (ref 1.6–8.3)
NEUTROPHILS NFR BLD AUTO: 70 %
NRBC # BLD AUTO: 0 10E3/UL
NRBC BLD AUTO-RTO: 0 /100
PLATELET # BLD AUTO: 293 10E3/UL (ref 150–450)
POTASSIUM SERPL-SCNC: 3.9 MMOL/L (ref 3.4–5.3)
PROT SERPL-MCNC: 7.5 G/DL (ref 6.4–8.3)
RBC # BLD AUTO: 5.01 10E6/UL (ref 3.8–5.2)
SODIUM SERPL-SCNC: 141 MMOL/L (ref 135–145)
WBC # BLD AUTO: 8.4 10E3/UL (ref 4–11)

## 2024-10-19 PROCEDURE — 83690 ASSAY OF LIPASE: CPT | Performed by: PHYSICIAN ASSISTANT

## 2024-10-19 PROCEDURE — 80053 COMPREHEN METABOLIC PANEL: CPT | Performed by: PHYSICIAN ASSISTANT

## 2024-10-19 PROCEDURE — 85025 COMPLETE CBC W/AUTO DIFF WBC: CPT | Performed by: PHYSICIAN ASSISTANT

## 2024-10-19 PROCEDURE — 36415 COLL VENOUS BLD VENIPUNCTURE: CPT | Performed by: SOCIAL WORKER

## 2024-10-19 PROCEDURE — 99284 EMERGENCY DEPT VISIT MOD MDM: CPT

## 2024-10-19 PROCEDURE — 85025 COMPLETE CBC W/AUTO DIFF WBC: CPT | Performed by: SOCIAL WORKER

## 2024-10-19 RX ORDER — DOCUSATE SODIUM 100 MG/1
100 CAPSULE, LIQUID FILLED ORAL 2 TIMES DAILY PRN
Qty: 14 CAPSULE | Refills: 0 | Status: SHIPPED | OUTPATIENT
Start: 2024-10-19 | End: 2024-10-26

## 2024-10-19 ASSESSMENT — COLUMBIA-SUICIDE SEVERITY RATING SCALE - C-SSRS
6. HAVE YOU EVER DONE ANYTHING, STARTED TO DO ANYTHING, OR PREPARED TO DO ANYTHING TO END YOUR LIFE?: NO
2. HAVE YOU ACTUALLY HAD ANY THOUGHTS OF KILLING YOURSELF IN THE PAST MONTH?: NO
1. IN THE PAST MONTH, HAVE YOU WISHED YOU WERE DEAD OR WISHED YOU COULD GO TO SLEEP AND NOT WAKE UP?: NO

## 2024-10-19 ASSESSMENT — ACTIVITIES OF DAILY LIVING (ADL)
ADLS_ACUITY_SCORE: 36
ADLS_ACUITY_SCORE: 34

## 2024-10-20 NOTE — ED PROVIDER NOTES
Emergency Department Note      History of Present Illness     Chief Complaint   Rectal bleeding.    HPI   Marline Osuna is a 19 year old female with a history of IBS-C, appendectomy, cholecystectomy and as noted below who presents to the emergency department for rectal bleeding. The patient states that tonight, while wiping after a bowel movement, she experienced 1 episode of bright red blood on the toilet paper with wiping.  It was not mixed into the stool. No melena.  She reports that ever since her cholecystectomy a few years ago, she has been experiencing chronic diarrhea/alternating w/constipation, nausea and abdominal pain. Contrary to triage note she is adament these symptoms are completely unchanged from her baseline and the only thing that brought her in was the rectal bleeding.  She adds that she was prescribed pantoprazole for her chronic abdominal pain but adds that she only takes this sometimes. Had a hard stool prior at the time of the blood not on laxitives. No rectal pain.  No vomiting. No dysuria or hematuria. No blood thinner use. Notes hx of previous lipase elevation and has seen [facility] in past for symptoms and had scope done.    Independent Historian   None    Review of External Notes   Reviewed Leda June 8/15/24 [facility] Upper endoscopy note hx GERD, IBS and eosinophillic esophagitis.  No ulcer    Past Medical History     Medical History and Problem List   Anxiety  Eosinophilic esophagitis  PTSD    Medications   buPROPion (WELLBUTRIN SR) 100 MG 12 hr tablet  mirtazapine (REMERON) 15 MG tablet  omeprazole (PRILOSEC) 20 MG DR capsule    Surgical History   Appendectomy  Cholecystectomy  EGD  Upper GI endoscopy  Ear tubes  Physical Exam     Patient Vitals for the past 24 hrs:   BP Temp Temp src Pulse Resp SpO2 Weight   10/19/24 2029 124/61 -- -- -- -- -- --   10/19/24 2028 -- 98.6  F (37  C) Temporal 104 18 97 % 48.6 kg (107 lb 2.3 oz)     Physical Exam  General: Awake, alert,  non-toxic.  Head:  Scalp is NC/AT  Eyes:  Conjunctiva normal  ENT:  The external nose and ears are normal.   Neck:  Normal range of motion without rigidity.  CV:  RRR, HR 88 on my exam.  No murmurs.  Resp:  Non-labored, no retractions or accessory muscle use  Abdomen: Abdomen is soft, no distension, mild epigastric ttp (baseline per pt) no rebound or guarding, no masses. No CVA tenderness. Rectal exam w/anal fissure @ 12 o'clock position.  Small amount of dried blood adjacent no active bleeding.  No hemorrhoids.  No redness or induration.  (Performed in the presence of female chaparone Hilary EDT and pts female friend)  MS:  No lower extremity edema/swelling.   Skin:  Warm and dry, No rash or lesions noted.  Neuro:  Alert and oriented.  GCS 15 Moves all extremities normal.  No facial asymmetry. Gait normal.  Psych: Awake. Alert. Normal affect. Appropriate interactions.      Diagnostics     Lab Results   Labs Ordered and Resulted from Time of ED Arrival to Time of ED Departure   COMPREHENSIVE METABOLIC PANEL - Abnormal       Result Value    Sodium 141      Potassium 3.9      Carbon Dioxide (CO2) 23      Anion Gap 15      Urea Nitrogen 11.3      Creatinine 0.65      GFR Estimate >90      Calcium 9.8      Chloride 103      Glucose 91      Alkaline Phosphatase 103      AST 43       (*)     Protein Total 7.5      Albumin 5.0      Bilirubin Total 0.3     LIPASE - Abnormal    Lipase 111 (*)    CBC WITH PLATELETS AND DIFFERENTIAL    WBC Count 8.4      RBC Count 5.01      Hemoglobin 14.9      Hematocrit 41.8      MCV 83      MCH 29.7      MCHC 35.6      RDW 11.8      Platelet Count 293      % Neutrophils 70      % Lymphocytes 24      % Monocytes 6      % Eosinophils 0      % Basophils 0      % Immature Granulocytes 0      NRBCs per 100 WBC 0      Absolute Neutrophils 5.8      Absolute Lymphocytes 2.0      Absolute Monocytes 0.5      Absolute Eosinophils 0.0      Absolute Basophils 0.0      Absolute Immature  Granulocytes 0.0      Absolute NRBCs 0.0       Imaging   None    Independent Interpretation   None    ED Course      Medications Administered   Medications - No data to display    Discussion of Management   None    ED Course   ED Course as of 10/19/24 2239   Sat Oct 19, 2024   2143 I obtained history and examined the patient as noted above.      2158 I rechecked the patient. I performed female-chaperoned rectal exam. Chaperoned by emergency department James hall.     2223 I rechecked the patient. I discussed findings and discharge with the patient. All questions answered.        Additional Documentation  None    Medical Decision Making / Diagnosis     CMS Diagnoses: None    MIPS   None    MDM   Marline Osuna is a pleasant 19 year old female who presents for concerns of rectal bleeding one episode this evening while wiping after hard bowel movement.  Exam w/small anal fissure as likely source of this.  No signs of infection or hemorrhoid. Labs w/normal hgb, platelets, normal vitals strongly doubt more serious internal upper or lower GI bleed.  Does have chronic abdominal pain and deals w/IBS w/both constipation and diarrheal components though adament those sx unchanged from baseline.  Labs w/mild lipase elevation <2x ULN clinically not w/pancreatitis no vomiting mild ALT elevation but other LFT's, bilirubin, alk phos normal also not suspect retained CBD stone, mass, abscess, etc. Abdominal exam benign wbc normal do not feel advanced imaging or admission indicated at this time.    Recommend stool softener, sitz baths topical nifedipine supportive care for fissure.  Should follow-up back w/GI given her ongoing GI issues and to get labs re-checked which we discussed and she plans to do.  Can also discuss fissure w/them if not resolved.  Return for increased bleeding, hematemesis, fever, vomiting, increasing pain, dizziness, sob or other new or worsening sx or concerns.    Disposition   The patient was discharged.      Diagnosis     ICD-10-CM    1. Anal fissure  K60.2       2. Elevated lipase  R74.8       3. Chronic abdominal pain  R10.9     G89.29       4. Irritable bowel syndrome with constipation  K58.1          Discharge Medications   New Prescriptions    DOCUSATE SODIUM (COLACE) 100 MG CAPSULE    Take 1 capsule (100 mg) by mouth 2 times daily as needed for constipation.    NIFEDIPINE 0.2% IN WHITE PETROLATUM 0.2 % OINT OINTMENT    Apply topically 2 times daily for 14 days.     Scribe Disclosure:  CARLOS ALBERTO, Hung Ornelas, am serving as a scribe at 9:19 PM on 10/19/2024 to document services personally performed by Gonzalez Bhandair PA-C based on my observations and the provider's statements to me.        Gonzalez Bhandari PA-C  10/20/24 [id number]

## 2024-10-20 NOTE — ED TRIAGE NOTES
Presents to triage with c/o one episode of what looked like liquid bile per rectum followed by blood on the tissue when patient was wiping. Denies any rectal pain. Patient states that she also has mid upper abdominal pain that is chronic

## 2024-12-31 ENCOUNTER — HOSPITAL ENCOUNTER (EMERGENCY)
Facility: CLINIC | Age: 19
Discharge: HOME | End: 2024-12-31
Admitting: EMERGENCY MEDICINE
Payer: COMMERCIAL

## 2024-12-31 VITALS
WEIGHT: 108.69 LBS | BODY MASS INDEX: 20.13 KG/M2 | OXYGEN SATURATION: 98 % | HEART RATE: 105 BPM | DIASTOLIC BLOOD PRESSURE: 75 MMHG | SYSTOLIC BLOOD PRESSURE: 132 MMHG | RESPIRATION RATE: 16 BRPM | TEMPERATURE: 98.6 F

## 2024-12-31 LAB
ALBUMIN UR-MCNC: NEGATIVE MG/DL
ANION GAP SERPL CALCULATED.3IONS-SCNC: 16 MMOL/L (ref 7–15)
APPEARANCE UR: CLEAR
BASOPHILS # BLD AUTO: 0.1 10E3/UL (ref 0–0.2)
BASOPHILS NFR BLD AUTO: 1 %
BILIRUB UR QL STRIP: NEGATIVE
BUN SERPL-MCNC: 9.3 MG/DL (ref 6–20)
CALCIUM SERPL-MCNC: 9.7 MG/DL (ref 8.8–10.4)
CHLORIDE SERPL-SCNC: 102 MMOL/L (ref 98–107)
COLOR UR AUTO: ABNORMAL
CREAT SERPL-MCNC: 0.68 MG/DL (ref 0.51–0.95)
EGFRCR SERPLBLD CKD-EPI 2021: >90 ML/MIN/1.73M2
EOSINOPHIL # BLD AUTO: 0 10E3/UL (ref 0–0.7)
EOSINOPHIL NFR BLD AUTO: 0 %
ERYTHROCYTE [DISTWIDTH] IN BLOOD BY AUTOMATED COUNT: 11.5 % (ref 10–15)
GLUCOSE BLDC GLUCOMTR-MCNC: 107 MG/DL (ref 70–99)
GLUCOSE SERPL-MCNC: 102 MG/DL (ref 70–99)
GLUCOSE UR STRIP-MCNC: NEGATIVE MG/DL
HCG INTACT+B SERPL-ACNC: <1 MIU/ML
HCO3 SERPL-SCNC: 21 MMOL/L (ref 22–29)
HCT VFR BLD AUTO: 43 % (ref 35–47)
HGB BLD-MCNC: 14.7 G/DL (ref 11.7–15.7)
HGB UR QL STRIP: NEGATIVE
HOLD SPECIMEN: NORMAL
IMM GRANULOCYTES # BLD: 0 10E3/UL
IMM GRANULOCYTES NFR BLD: 0 %
KETONES UR STRIP-MCNC: NEGATIVE MG/DL
LEUKOCYTE ESTERASE UR QL STRIP: ABNORMAL
LYMPHOCYTES # BLD AUTO: 1.9 10E3/UL (ref 0.8–5.3)
LYMPHOCYTES NFR BLD AUTO: 24 %
MCH RBC QN AUTO: 29.3 PG (ref 26.5–33)
MCHC RBC AUTO-ENTMCNC: 34.2 G/DL (ref 31.5–36.5)
MCV RBC AUTO: 86 FL (ref 78–100)
MONOCYTES # BLD AUTO: 0.4 10E3/UL (ref 0–1.3)
MONOCYTES NFR BLD AUTO: 5 %
MUCOUS THREADS #/AREA URNS LPF: PRESENT /LPF
NEUTROPHILS # BLD AUTO: 5.7 10E3/UL (ref 1.6–8.3)
NEUTROPHILS NFR BLD AUTO: 70 %
NITRATE UR QL: NEGATIVE
NRBC # BLD AUTO: 0 10E3/UL
NRBC BLD AUTO-RTO: 0 /100
PH UR STRIP: 7.5 [PH] (ref 5–7)
PLATELET # BLD AUTO: 249 10E3/UL (ref 150–450)
POTASSIUM SERPL-SCNC: 3.8 MMOL/L (ref 3.4–5.3)
RBC # BLD AUTO: 5.01 10E6/UL (ref 3.8–5.2)
RBC URINE: 2 /HPF
SODIUM SERPL-SCNC: 139 MMOL/L (ref 135–145)
SP GR UR STRIP: 1.01 (ref 1–1.03)
SQUAMOUS EPITHELIAL: 2 /HPF
UROBILINOGEN UR STRIP-MCNC: NORMAL MG/DL
WBC # BLD AUTO: 8.2 10E3/UL (ref 4–11)
WBC URINE: 9 /HPF

## 2024-12-31 PROCEDURE — 84702 CHORIONIC GONADOTROPIN TEST: CPT | Performed by: EMERGENCY MEDICINE

## 2024-12-31 PROCEDURE — 82962 GLUCOSE BLOOD TEST: CPT

## 2024-12-31 PROCEDURE — 99281 EMR DPT VST MAYX REQ PHY/QHP: CPT

## 2024-12-31 PROCEDURE — 82374 ASSAY BLOOD CARBON DIOXIDE: CPT | Performed by: EMERGENCY MEDICINE

## 2024-12-31 PROCEDURE — 36415 COLL VENOUS BLD VENIPUNCTURE: CPT | Performed by: EMERGENCY MEDICINE

## 2024-12-31 PROCEDURE — 85004 AUTOMATED DIFF WBC COUNT: CPT | Performed by: EMERGENCY MEDICINE

## 2024-12-31 PROCEDURE — 81003 URINALYSIS AUTO W/O SCOPE: CPT | Performed by: EMERGENCY MEDICINE

## 2024-12-31 PROCEDURE — 93005 ELECTROCARDIOGRAM TRACING: CPT

## 2024-12-31 PROCEDURE — 80048 BASIC METABOLIC PNL TOTAL CA: CPT | Performed by: EMERGENCY MEDICINE

## 2024-12-31 ASSESSMENT — COLUMBIA-SUICIDE SEVERITY RATING SCALE - C-SSRS
6. HAVE YOU EVER DONE ANYTHING, STARTED TO DO ANYTHING, OR PREPARED TO DO ANYTHING TO END YOUR LIFE?: NO
1. IN THE PAST MONTH, HAVE YOU WISHED YOU WERE DEAD OR WISHED YOU COULD GO TO SLEEP AND NOT WAKE UP?: NO
2. HAVE YOU ACTUALLY HAD ANY THOUGHTS OF KILLING YOURSELF IN THE PAST MONTH?: NO

## 2024-12-31 NOTE — ED TRIAGE NOTES
"      Patient presents to ER due to near syncopal episode yesterday, headache and feeling \"foggy\". Symptoms started at 1pm yesterday. Seen at  but sent to ER for eval. In triage, BEFAST negative.  in triage.   "

## 2025-01-01 ENCOUNTER — HOSPITAL ENCOUNTER (EMERGENCY)
Facility: CLINIC | Age: 20
Discharge: HOME | End: 2025-01-01
Attending: EMERGENCY MEDICINE
Payer: COMMERCIAL

## 2025-01-01 ENCOUNTER — APPOINTMENT (OUTPATIENT)
Dept: MRI IMAGING | Facility: CLINIC | Age: 20
End: 2025-01-01
Attending: EMERGENCY MEDICINE
Payer: COMMERCIAL

## 2025-01-01 VITALS
DIASTOLIC BLOOD PRESSURE: 57 MMHG | HEIGHT: 61 IN | TEMPERATURE: 98.1 F | RESPIRATION RATE: 18 BRPM | HEART RATE: 64 BPM | BODY MASS INDEX: 19.81 KG/M2 | OXYGEN SATURATION: 100 % | SYSTOLIC BLOOD PRESSURE: 105 MMHG | WEIGHT: 104.94 LBS

## 2025-01-01 DIAGNOSIS — R55 SYNCOPE, UNSPECIFIED SYNCOPE TYPE: ICD-10-CM

## 2025-01-01 DIAGNOSIS — R51.9 PRESSURE IN HEAD: ICD-10-CM

## 2025-01-01 PROCEDURE — A9585 GADOBUTROL INJECTION: HCPCS | Performed by: EMERGENCY MEDICINE

## 2025-01-01 PROCEDURE — 70553 MRI BRAIN STEM W/O & W/DYE: CPT

## 2025-01-01 PROCEDURE — 99285 EMERGENCY DEPT VISIT HI MDM: CPT | Mod: 25

## 2025-01-01 PROCEDURE — 70544 MR ANGIOGRAPHY HEAD W/O DYE: CPT

## 2025-01-01 PROCEDURE — 255N000002 HC RX 255 OP 636: Performed by: EMERGENCY MEDICINE

## 2025-01-01 PROCEDURE — 70549 MR ANGIOGRAPH NECK W/O&W/DYE: CPT

## 2025-01-01 RX ORDER — GADOBUTROL 604.72 MG/ML
10 INJECTION INTRAVENOUS ONCE
Status: COMPLETED | OUTPATIENT
Start: 2025-01-01 | End: 2025-01-01

## 2025-01-01 RX ADMIN — GADOBUTROL 10 ML: 604.72 INJECTION INTRAVENOUS at 18:24

## 2025-01-01 ASSESSMENT — ACTIVITIES OF DAILY LIVING (ADL)
ADLS_ACUITY_SCORE: 49

## 2025-01-01 ASSESSMENT — COLUMBIA-SUICIDE SEVERITY RATING SCALE - C-SSRS
2. HAVE YOU ACTUALLY HAD ANY THOUGHTS OF KILLING YOURSELF IN THE PAST MONTH?: NO
1. IN THE PAST MONTH, HAVE YOU WISHED YOU WERE DEAD OR WISHED YOU COULD GO TO SLEEP AND NOT WAKE UP?: NO
6. HAVE YOU EVER DONE ANYTHING, STARTED TO DO ANYTHING, OR PREPARED TO DO ANYTHING TO END YOUR LIFE?: NO

## 2025-01-01 NOTE — ED PROVIDER NOTES
"  Emergency Department Note      History of Present Illness     Chief Complaint   Dizziness      HPI   Marline Osuna is a 19 year old female who presents for dizziness. She was seen at an  yesterday with a near syncopal event, suggested she go to the ER for imaging, she left without seeing a doctor but had labs done. 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 Denies all warning signs besides a \"wave\". Denies hitting her head, the resident caught her. Reports she was having difficulty answering questions after waking up. She reports having a intense head pressure as if she is upside down that started the following day. This pressure was in the front of her head yesterday but is now in the back. She reports a history of fainting but the resulting head pressure always goes away quickly instead of lasting for days after the event. Denies feeling dizzy. Reports taking mirtazapine and she thinks it might be causing her to faint. Also reports of wisdom tooth pain.    Independent Historian   None      Past Medical History     Medical History and Problem List   Deliberate self-cutting  Eosinophilic esophagitis  Moderate episode of recurrent MDD  Physiologic disturbance of temperature regulation  PTSD  SI  Anxiety       Medications   Buspar  Omnicef  Neurontin  Depo-provera  Reglan  Pamelor  Deltasone  Remeron  Wellbutrin    Surgical History   Appendectomy  Cholecystectomy  Colonoscopy  EGD  Endoscopic ultrasound upper GI tract    Physical Exam     Patient Vitals for the past 24 hrs:   BP Temp Temp src Pulse Resp SpO2 Height Weight   01/01/25 1638 105/57 -- -- 64 18 100 % -- --   01/01/25 1457 128/65 98.1  F (36.7  C) Oral 87 18 99 % 1.549 m (5' 1\") 47.6 kg (104 lb 15 oz)     Physical Exam  General: Patient is alert and cooperative.  HENT:  No trauma; no facial weakness or asymmetry.   Eyes: EOMI. Normal conjunctiva.  Neck:  Normal range of motion and " appearance.   Cardiovascular:  Normal rate.   Pulmonary/Chest:  Effort normal. No wheezing or crackles.  Abdominal: Soft. No distension or tenderness.     Musculoskeletal: Normal range of motion. No edema or tenderness.   Neurological: oriented, normal strength, sensation, and coordination.   Skin: Warm and dry. No rash or bruising.   Psychiatric: Normal mood and affect. Normal behavior and judgement.      Diagnostics     Lab Results   Labs Ordered and Resulted from Time of ED Arrival to Time of ED Departure - No data to display    Imaging   MRA Brain ([place]) wo Contrast   Final Result   IMPRESSION:   1.  No intracranial arterial large vessel occlusion or significant stenosis.      2.  No brain aneurysm identified.      3.  No AVM/AVF.      MRA Neck ([place]) wo & w Contrast   Final Result   IMPRESSION:   1.  No significant stenosis or occlusion.      2.  No cervical artery dissection.      MR Brain w/o & w Contrast   Final Result   IMPRESSION:   1.  No acute infarct.      2.  Unremarkable MRI of the brain.          Independent Interpretation   None    ED Course      Medications Administered   Medications   gadobutrol (GADAVIST) injection 10 mL (10 mLs Intravenous $Given 1/1/25 1824)   sodium chloride (PF) 0.9% PF flush 60 mL (100 mLs Intravenous $Given 1/1/25 1824)       Procedures   Procedures     Discussion of Management   None    ED Course   ED Course as of 01/01/25 2026 Wed Jan 01, 2025   1622 I obtained history and examined the patient as noted above     2026 I rechecked the patient and explained findings.         Additional Documentation  None    Medical Decision Making / Diagnosis     CMS Diagnoses: None    MIPS       None    MDM   Marline Osuna is a 19 year old female with complaints of head pressure, feeling upside down after recent syncopal event.  Normal exam.  UC work up neg, advised to come to ED for imaging.  MRI brain/MRA head/neck neg.  Advise follow up .    Disposition   The  patient was discharged.     Diagnosis     ICD-10-CM    1. Pressure in head  R51.9       2. Syncope, unspecified syncope type  R55            Discharge Medications   New Prescriptions    No medications on file         Scribe Disclosure:  I, Ozzie Bryant, am serving as a scribe at 4:36 PM on 1/1/2025 to document services personally performed by Alexis Soto MD based on my observations and the provider's statements to me.        Alexis Soto MD  01/02/25 [id number]

## 2025-01-01 NOTE — ED TRIAGE NOTES
"Patient states for the last week her head \"feels like it is upside down\". Patient was seen yesterday for the same issue but returns because she wasn't given the MRI urgent care told her she needed.      Triage Assessment (Adult)       Row Name 01/01/25 1457          Triage Assessment    Airway WDL WDL        Respiratory WDL    Respiratory WDL WDL        Skin Circulation/Temperature WDL    Skin Circulation/Temperature WDL WDL        Cardiac WDL    Cardiac WDL WDL        Peripheral/Neurovascular WDL    Peripheral Neurovascular WDL WDL        Cognitive/Neuro/Behavioral WDL    Cognitive/Neuro/Behavioral WDL WDL                     "

## 2025-01-02 LAB
ATRIAL RATE - MUSE: 77 BPM
DIASTOLIC BLOOD PRESSURE - MUSE: NORMAL MMHG
INTERPRETATION ECG - MUSE: NORMAL
P AXIS - MUSE: 43 DEGREES
PR INTERVAL - MUSE: 112 MS
QRS DURATION - MUSE: 78 MS
QT - MUSE: 352 MS
QTC - MUSE: 398 MS
R AXIS - MUSE: 86 DEGREES
SYSTOLIC BLOOD PRESSURE - MUSE: NORMAL MMHG
T AXIS - MUSE: 46 DEGREES
VENTRICULAR RATE- MUSE: 77 BPM

## (undated) DEVICE — ESU GROUND PAD ADULT W/CORD E7507

## (undated) DEVICE — SOL WATER IRRIG 1000ML BOTTLE 2F7114

## (undated) DEVICE — KIT PROCEDURE W/CLEAN-A-SCOPE LINERS V2 200800

## (undated) DEVICE — LINEN FULL SHEET 5511

## (undated) DEVICE — COVER FOOTSWITCH URO

## (undated) DEVICE — GOWN LG DISP 9515

## (undated) DEVICE — DECANTER BAG 2002S

## (undated) DEVICE — BAG CLEAR TRASH 1.3M 39X33" P4040C

## (undated) DEVICE — GOWN XLG DISP 9545

## (undated) DEVICE — ENDO FORCEP ENDOJAW BIOPSY 2.8MMX230CM FB-220U

## (undated) DEVICE — LINEN HALF SHEET 5512

## (undated) DEVICE — RAD RX ISOVUE 300 (50ML) 61% IOPAMIDOL CHARGE PER ML

## (undated) DEVICE — TUBING SUCTION 12"X1/4" N612

## (undated) DEVICE — SOL NACL 0.9% INJ 250ML BAG 2B1322Q

## (undated) DEVICE — PACK ERCP CUSTOM RIDGES

## (undated) DEVICE — BALLOON ULTRAMATRIX FOR OLYMPUS EUS LINEAR LATEX FREE USB-OL

## (undated) DEVICE — SUCTION MANIFOLD NEPTUNE 2 SYS 4 PORT 0702-020-000

## (undated) RX ORDER — ACETAMINOPHEN 325 MG/1
TABLET ORAL
Status: DISPENSED
Start: 2024-08-15

## (undated) RX ORDER — ONDANSETRON 2 MG/ML
INJECTION INTRAMUSCULAR; INTRAVENOUS
Status: DISPENSED
Start: 2024-08-15

## (undated) RX ORDER — PROPOFOL 10 MG/ML
INJECTION, EMULSION INTRAVENOUS
Status: DISPENSED
Start: 2024-08-15